# Patient Record
Sex: FEMALE | Race: WHITE | Employment: FULL TIME | URBAN - METROPOLITAN AREA
[De-identification: names, ages, dates, MRNs, and addresses within clinical notes are randomized per-mention and may not be internally consistent; named-entity substitution may affect disease eponyms.]

---

## 2023-06-25 ENCOUNTER — APPOINTMENT (OUTPATIENT)
Dept: GENERAL RADIOLOGY | Age: 63
End: 2023-06-25
Attending: NURSE PRACTITIONER
Payer: OTHER MISCELLANEOUS

## 2023-06-25 ENCOUNTER — HOSPITAL ENCOUNTER (OUTPATIENT)
Age: 63
Discharge: HOME | End: 2023-06-25
Payer: OTHER MISCELLANEOUS

## 2023-06-25 VITALS
WEIGHT: 134 LBS | TEMPERATURE: 98 F | OXYGEN SATURATION: 98 % | SYSTOLIC BLOOD PRESSURE: 145 MMHG | HEART RATE: 83 BPM | BODY MASS INDEX: 24.66 KG/M2 | RESPIRATION RATE: 18 BRPM | DIASTOLIC BLOOD PRESSURE: 89 MMHG | HEIGHT: 62 IN

## 2023-06-25 DIAGNOSIS — S92.354A CLOSED NONDISPLACED FRACTURE OF FIFTH METATARSAL BONE OF RIGHT FOOT, INITIAL ENCOUNTER: Primary | ICD-10-CM

## 2023-06-25 DIAGNOSIS — S99.919A ANKLE INJURY: ICD-10-CM

## 2023-06-25 PROCEDURE — 73610 X-RAY EXAM OF ANKLE: CPT | Performed by: NURSE PRACTITIONER

## 2023-06-25 PROCEDURE — E0114 CRUTCH UNDERARM PAIR NO WOOD: HCPCS | Performed by: NURSE PRACTITIONER

## 2023-06-25 PROCEDURE — 29515 APPLICATION SHORT LEG SPLINT: CPT | Performed by: NURSE PRACTITIONER

## 2023-06-25 PROCEDURE — 99203 OFFICE O/P NEW LOW 30 MIN: CPT | Performed by: NURSE PRACTITIONER

## 2023-06-25 PROCEDURE — 73630 X-RAY EXAM OF FOOT: CPT | Performed by: NURSE PRACTITIONER

## 2023-06-25 NOTE — ED INITIAL ASSESSMENT (HPI)
Pt states was standing on a step ladder at work on Thursday when it broke and she fell injuring right foot/ankle.   Icing and elevating at home

## 2023-06-25 NOTE — DISCHARGE INSTRUCTIONS
Outpatient follow up with [facility]. Please call in the morning to make follow-up appointments.

## 2023-06-26 ENCOUNTER — TELEPHONE (OUTPATIENT)
Dept: ORTHOPEDICS CLINIC | Facility: CLINIC | Age: 63
End: 2023-06-26

## 2023-06-26 NOTE — TELEPHONE ENCOUNTER
Future Appointments   Date Time Provider Donal Sullivan   6/29/2023 10:30 AM Svitlana Melton DPM [facility] [id number]

## 2023-06-26 NOTE — TELEPHONE ENCOUNTER
Patient has a right 5th metatarsal fx, xray in Epic. Please advise if okay to add to 6/29 or need to be seen sooner.

## 2023-06-29 ENCOUNTER — OFFICE VISIT (OUTPATIENT)
Dept: ORTHOPEDICS CLINIC | Facility: CLINIC | Age: 63
End: 2023-06-29
Payer: OTHER MISCELLANEOUS

## 2023-06-29 VITALS — WEIGHT: 134 LBS | HEIGHT: 62 IN | BODY MASS INDEX: 24.66 KG/M2

## 2023-06-29 DIAGNOSIS — M85.80 OSTEOPENIA, UNSPECIFIED LOCATION: ICD-10-CM

## 2023-06-29 DIAGNOSIS — S92.301A CLOSED NONDISPLACED FRACTURE OF METATARSAL BONE OF RIGHT FOOT, UNSPECIFIED METATARSAL, INITIAL ENCOUNTER: Primary | ICD-10-CM

## 2023-06-29 DIAGNOSIS — S93.401A SPRAIN OF RIGHT ANKLE, UNSPECIFIED LIGAMENT, INITIAL ENCOUNTER: ICD-10-CM

## 2023-06-29 PROCEDURE — 3008F BODY MASS INDEX DOCD: CPT | Performed by: PODIATRIST

## 2023-06-29 PROCEDURE — 99204 OFFICE O/P NEW MOD 45 MIN: CPT | Performed by: PODIATRIST

## 2023-07-05 ENCOUNTER — TELEPHONE (OUTPATIENT)
Dept: ORTHOPEDICS CLINIC | Facility: CLINIC | Age: 63
End: 2023-07-05

## 2023-07-05 NOTE — TELEPHONE ENCOUNTER
Patient calling requesting a note so she is able to return to work, with no restrictions, starting next week, 07/10. Once letter is completed, she would like a call so she can come in and  the letter. Thanks!

## 2023-07-06 NOTE — TELEPHONE ENCOUNTER
Called and let patient know she can pick it up. Patient coming to [facility] office on 7/7. Patient also asking if she could get a placard for work. She stated that she has to park far from the door and the handicap parking is much closer. Stated she was not in pain and \"no big deal\" if we are unable to accommodate.      Future Appointments   Date Time Provider Donal Sullivan   8/10/2023  8:00 AM Oneita Fleischer., DPM [facility] [id number]

## 2023-07-07 ENCOUNTER — TELEPHONE (OUTPATIENT)
Dept: ORTHOPEDICS CLINIC | Facility: CLINIC | Age: 63
End: 2023-07-07

## 2023-07-07 NOTE — TELEPHONE ENCOUNTER
Received [employer] Form to be completed. I have contacted pt to get fax number to send completed form to: [phone]. I have sent form to forms department via email. No DEANDRE or fee has been completed. Thanks!

## 2023-07-07 NOTE — TELEPHONE ENCOUNTER
[employer] forms received and logged for processing. No [employer] message sent to pt as pt is not active on [employer]. Attempted to reach pt no answer LVM.

## 2023-08-09 ENCOUNTER — TELEPHONE (OUTPATIENT)
Dept: ORTHOPEDICS CLINIC | Facility: CLINIC | Age: 63
End: 2023-08-09

## 2023-08-09 DIAGNOSIS — M79.671 RIGHT FOOT PAIN: Primary | ICD-10-CM

## 2023-08-10 ENCOUNTER — HOSPITAL ENCOUNTER (OUTPATIENT)
Dept: GENERAL RADIOLOGY | Age: 63
Discharge: HOME | End: 2023-08-10
Attending: PODIATRIST
Payer: OTHER MISCELLANEOUS

## 2023-08-10 ENCOUNTER — OFFICE VISIT (OUTPATIENT)
Dept: ORTHOPEDICS CLINIC | Facility: CLINIC | Age: 63
End: 2023-08-10
Payer: OTHER MISCELLANEOUS

## 2023-08-10 VITALS — HEIGHT: 62 IN | BODY MASS INDEX: 24.66 KG/M2 | WEIGHT: 134 LBS

## 2023-08-10 DIAGNOSIS — S92.301D CLOSED NONDISPLACED FRACTURE OF METATARSAL BONE OF RIGHT FOOT WITH ROUTINE HEALING, UNSPECIFIED METATARSAL, SUBSEQUENT ENCOUNTER: ICD-10-CM

## 2023-08-10 DIAGNOSIS — S93.401D SPRAIN OF RIGHT ANKLE, UNSPECIFIED LIGAMENT, SUBSEQUENT ENCOUNTER: ICD-10-CM

## 2023-08-10 DIAGNOSIS — M85.80 OSTEOPENIA, UNSPECIFIED LOCATION: ICD-10-CM

## 2023-08-10 DIAGNOSIS — M79.671 RIGHT FOOT PAIN: ICD-10-CM

## 2023-08-10 PROCEDURE — 3008F BODY MASS INDEX DOCD: CPT | Performed by: PODIATRIST

## 2023-08-10 PROCEDURE — 73630 X-RAY EXAM OF FOOT: CPT | Performed by: PODIATRIST

## 2023-08-10 PROCEDURE — 99213 OFFICE O/P EST LOW 20 MIN: CPT | Performed by: PODIATRIST

## 2023-08-10 NOTE — PROGRESS NOTES
[facility] Progress Note    Subjective:     Ruben Rivas is here for follow-up now about 7 weeks post injury to her fifth metatarsal and right ankle  She has been in the boot full-time and is working at [employer] in the boot. Having no pain and feeling like she would like to get out of the boot      Objective:     Exam no palpable tenderness no swelling. Mild atrophy of the intrinsic muscles. Right  Ankle is stable no ankle pain right        Imaging: X-rays show progressive healing of the fracture oblique fifth metatarsal fracture right foot        Assessment/Plan:     Diagnoses and all orders for this visit:    Closed nondisplaced fracture of metatarsal bone of right foot with routine healing, unspecified metatarsal, subsequent encounter    Osteopenia, unspecified location    Sprain of right ankle, unspecified ligament, subsequent encounter        Does not need to start PT, and we will do exercises at home. If she finds there is any instability or feeling of giving way continued problems let us know and we can initiate PT through MyChart    Final xray in 4-6 weeks and quick visit    1 more week work only with boot as work is physical      [facility]. Rena Lam DPM  [facility]    [employer] speech recognition software was used to prepare this note. If a word or phrase is confusing, it is likely do to a failure of recognition. Please contact me with any questions or clarifications.

## 2023-09-06 ENCOUNTER — TELEPHONE (OUTPATIENT)
Dept: ORTHOPEDICS CLINIC | Facility: CLINIC | Age: 63
End: 2023-09-06

## 2023-09-06 DIAGNOSIS — M79.671 RIGHT FOOT PAIN: Primary | ICD-10-CM

## 2023-09-06 NOTE — TELEPHONE ENCOUNTER
XR ordered and scheduled per Ortho protocol. Sent patient message via [employer] to inform them and ask them to arrive 15-20 minutes prior to appointment with Charly Diaz.

## 2023-09-07 ENCOUNTER — HOSPITAL ENCOUNTER (OUTPATIENT)
Dept: GENERAL RADIOLOGY | Age: 63
Discharge: HOME | End: 2023-09-07
Attending: PODIATRIST
Payer: OTHER MISCELLANEOUS

## 2023-09-07 ENCOUNTER — OFFICE VISIT (OUTPATIENT)
Dept: ORTHOPEDICS CLINIC | Facility: CLINIC | Age: 63
End: 2023-09-07
Payer: OTHER MISCELLANEOUS

## 2023-09-07 DIAGNOSIS — S93.401D SPRAIN OF RIGHT ANKLE, UNSPECIFIED LIGAMENT, SUBSEQUENT ENCOUNTER: ICD-10-CM

## 2023-09-07 DIAGNOSIS — M79.671 RIGHT FOOT PAIN: ICD-10-CM

## 2023-09-07 DIAGNOSIS — S92.301D CLOSED NONDISPLACED FRACTURE OF METATARSAL BONE OF RIGHT FOOT WITH ROUTINE HEALING, UNSPECIFIED METATARSAL, SUBSEQUENT ENCOUNTER: Primary | ICD-10-CM

## 2023-09-07 DIAGNOSIS — M85.80 OSTEOPENIA, UNSPECIFIED LOCATION: ICD-10-CM

## 2023-09-07 PROCEDURE — 73630 X-RAY EXAM OF FOOT: CPT | Performed by: PODIATRIST

## 2023-09-07 PROCEDURE — 99213 OFFICE O/P EST LOW 20 MIN: CPT | Performed by: PODIATRIST

## 2023-09-07 NOTE — PROGRESS NOTES
[facility] Progress Note    Subjective:     Jaki Lopez is here for follow-up of her fifth metatarsal a little over 2 months post injury now. She is doing well works full-time at [employer] and is having a little bit of swelling at the other day but no pain        Objective:     No palpable tenderness along the fifth metatarsal very mild subtle swelling right foot          Imaging: X-rays show the fracture to be healing a little gapping laterally on the lateral view remains. Assessment/Plan:     Diagnoses and all orders for this visit:    Closed nondisplaced fracture of metatarsal bone of right foot with routine healing, unspecified metatarsal, subsequent encounter    Osteopenia, unspecified location    Sprain of right ankle, unspecified ligament, subsequent encounter        No need for follow-up or further x-rays she can go back to work full-time and for the next month still wear good solid tennis shoe and then she can start going back into her regular shoes            Rissa Sisi. Simba Flores DPM  [facility]    [employer] speech recognition software was used to prepare this note. If a word or phrase is confusing, it is likely do to a failure of recognition. Please contact me with any questions or clarifications. Yes

## (undated) NOTE — LETTER
Date: 7/5/2023    Patient Name: Vince Elena          To Whom it may concern: This letter has been written at the patient's request. The above patient was seen at one of the [facility] locations for treatment of a medical condition. The patient may return to work on 7/10/23 with no restrictions. Sincerely,    David Garcia DPM

## (undated) NOTE — LETTER
Date & Time: 6/25/2023, 3:20 PM  Patient: Zaki Florez  Encounter Provider(s):    SHANNAN Gavin       To Whom It May Concern:    Gayla Haney was seen and treated in our department on 6/25/2023. She should not return to work until cleared by [facility] or Orthopedics .     If you have any questions or concerns, please do not hesitate to call.        _____________________________  Physician/APC Signature

## (undated) NOTE — LETTER
Date: 8/10/2023    Patient Name: Zafar Llamas          To Whom it may concern: This letter has been written at the patient's request. The above patient was seen at the [facility] for treatment of a medical condition. 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 Sincerely,    Laviina Montgomery DPM